# Patient Record
(demographics unavailable — no encounter records)

---

## 2024-11-20 NOTE — HISTORY OF PRESENT ILLNESS
[FreeTextEntry1] : Patient is a 40 year  old, P0, presenting for DepoProvera injection.    Last dose was given in office on 8/21/24 without issues. Patient states that she feels well overall and has no physical complaints at this time.   LMP: unsure - irregular spotting monthly   Concerns: none

## 2024-11-20 NOTE — PLAN
[FreeTextEntry1] : #DMPA injection Patient provided own medication for visit. UCG (-) at this time Medroxyprogesterone injection administered IM to R deltoid without issues @  3:30PM. LOT# [id number] EXP: 4/30/2027 Patient to RTO in 3 months for next injection administration

## 2025-05-15 NOTE — HISTORY OF PRESENT ILLNESS
[FreeTextEntry1] : Patient is a 41 year  old,  presenting for Depo-Provera injection.    1st dose was given in office on 11/20/25 without issues.   Patient states that she feels well overall and has no physical complaints at this time.   LMP: irregular cycles due to contraception; patient has some concerns about irregular spotting/bleeding between injections, but also understands that she needs to have injections on 3 month schedule.  SexualHx: not active and no concerns at this time

## 2025-05-15 NOTE — PLAN
[FreeTextEntry1] : #DMPA injection Patient provided own medication for visit. UCG (-) at this time Medroxyprogesterone injection administered IM to R deltoid without issues @ 9:30AM. LOT# [id number] EXP: 05/2026 Patient to schedule annual exam as discussed and RTO for in 3 months for next injection administration